# Patient Record
Sex: FEMALE | Race: WHITE | NOT HISPANIC OR LATINO | URBAN - NONMETROPOLITAN AREA
[De-identification: names, ages, dates, MRNs, and addresses within clinical notes are randomized per-mention and may not be internally consistent; named-entity substitution may affect disease eponyms.]

---

## 2022-11-09 ENCOUNTER — OFFICE VISIT (OUTPATIENT)
Dept: CARDIOLOGY | Facility: CLINIC | Age: 52
End: 2022-11-09

## 2022-11-09 VITALS
HEART RATE: 88 BPM | WEIGHT: 115.8 LBS | DIASTOLIC BLOOD PRESSURE: 76 MMHG | HEIGHT: 67 IN | SYSTOLIC BLOOD PRESSURE: 110 MMHG | OXYGEN SATURATION: 98 % | BODY MASS INDEX: 18.18 KG/M2

## 2022-11-09 DIAGNOSIS — R07.2 PRECORDIAL PAIN: ICD-10-CM

## 2022-11-09 DIAGNOSIS — R00.2 PALPITATIONS: ICD-10-CM

## 2022-11-09 DIAGNOSIS — F17.200 SMOKER: ICD-10-CM

## 2022-11-09 DIAGNOSIS — R06.02 SHORTNESS OF BREATH: Primary | ICD-10-CM

## 2022-11-09 PROBLEM — J45.909 ASTHMA: Status: ACTIVE | Noted: 2022-11-09

## 2022-11-09 PROBLEM — J44.9 COPD (CHRONIC OBSTRUCTIVE PULMONARY DISEASE): Status: ACTIVE | Noted: 2022-11-09

## 2022-11-09 PROCEDURE — 99204 OFFICE O/P NEW MOD 45 MIN: CPT | Performed by: NURSE PRACTITIONER

## 2022-11-09 PROCEDURE — 93000 ELECTROCARDIOGRAM COMPLETE: CPT | Performed by: NURSE PRACTITIONER

## 2022-11-09 RX ORDER — ALBUTEROL SULFATE 90 UG/1
AEROSOL, METERED RESPIRATORY (INHALATION)
COMMUNITY
Start: 2022-10-20

## 2022-11-09 RX ORDER — ESTRADIOL 2 MG/1
TABLET ORAL DAILY
COMMUNITY
Start: 2022-10-19

## 2022-11-09 RX ORDER — OXYBUTYNIN CHLORIDE 10 MG/1
TABLET, EXTENDED RELEASE ORAL DAILY
COMMUNITY
Start: 2022-10-19

## 2022-11-09 RX ORDER — NITROGLYCERIN 0.4 MG/1
TABLET SUBLINGUAL
Qty: 30 TABLET | Refills: 5 | Status: SHIPPED | OUTPATIENT
Start: 2022-11-09

## 2022-11-09 RX ORDER — ALBUTEROL SULFATE 2.5 MG/3ML
SOLUTION RESPIRATORY (INHALATION)
COMMUNITY
Start: 2022-10-19

## 2022-11-09 RX ORDER — ETHAMBUTOL HYDROCHLORIDE 400 MG/1
TABLET, FILM COATED ORAL DAILY
COMMUNITY
Start: 2022-10-19

## 2022-11-09 RX ORDER — TIOTROPIUM BROMIDE INHALATION SPRAY 3.12 UG/1
2 SPRAY, METERED RESPIRATORY (INHALATION) 2 TIMES DAILY
COMMUNITY

## 2022-11-09 RX ORDER — HYDROCODONE BITARTRATE AND ACETAMINOPHEN 10; 325 MG/1; MG/1
TABLET ORAL 2 TIMES DAILY PRN
COMMUNITY
Start: 2022-10-20

## 2022-11-09 RX ORDER — BUDESONIDE AND FORMOTEROL FUMARATE DIHYDRATE 160; 4.5 UG/1; UG/1
2 AEROSOL RESPIRATORY (INHALATION) 2 TIMES DAILY
COMMUNITY
Start: 2022-10-19

## 2022-11-09 RX ORDER — SUCRALFATE 1 G/1
1 TABLET ORAL 4 TIMES DAILY
COMMUNITY

## 2022-11-09 RX ORDER — CLARITHROMYCIN 500 MG/1
500 TABLET, COATED ORAL 2 TIMES DAILY
COMMUNITY

## 2022-11-09 NOTE — PROGRESS NOTES
Subjective     Jessica Kaur is a 52 y.o. female who presents to day for Establish Care (Here for eval. Per Dr. Mccarthy.), Chest Pain, Shortness of Breath, and Palpitations.    CHIEF COMPLIANT  Chief Complaint   Patient presents with   • Establish Care     Here for eval. Per Dr. Mccarthy.   • Chest Pain   • Shortness of Breath   • Palpitations     Problem List:    0. Chest pain  1. Shortness of breath  2. COPD / Asthma  3. Palpitations  4. Smoker    Problem List Items Addressed This Visit        Cardiac and Vasculature    Precordial pain    Relevant Medications    nitroglycerin (NITROSTAT) 0.4 MG SL tablet    Other Relevant Orders    ECG 12 Lead    Stress Test With Myocardial Perfusion One Day    Adult Transthoracic Echo Complete W/ Cont if Necessary Per Protocol    Palpitations    Relevant Medications    nitroglycerin (NITROSTAT) 0.4 MG SL tablet    Other Relevant Orders    ECG 12 Lead    Stress Test With Myocardial Perfusion One Day    Adult Transthoracic Echo Complete W/ Cont if Necessary Per Protocol       Pulmonary and Pneumonias    Shortness of breath - Primary    Relevant Medications    nitroglycerin (NITROSTAT) 0.4 MG SL tablet    Other Relevant Orders    ECG 12 Lead    Stress Test With Myocardial Perfusion One Day    Adult Transthoracic Echo Complete W/ Cont if Necessary Per Protocol       Tobacco    Smoker    Relevant Medications    nitroglycerin (NITROSTAT) 0.4 MG SL tablet    Other Relevant Orders    Stress Test With Myocardial Perfusion One Day    Adult Transthoracic Echo Complete W/ Cont if Necessary Per Protocol       HPI  Ms. Jessica Kaur is a 52-year-old female who is accompanied by her daughter today.    The patient is here today for cardiac evaluation to decrease diffusion capacity found by pulmonology, chest pain, dyspnea, and palpitations. She is currently not on any cardiac medication. The patient has a history of chronic obstructive pulmonary disease and asthma. She is a tobacco smoker, who smoked  2 to 3 packs a day in the past, but now smokes approximately 0.5 packs a day for approximately over 30 years. Her blood pressure is 110/76 mm Hg.    The patient states the onset of her chest pain was a week ago. She denied any pain before that time. The patient describes the pain as sharp and states she feels like someone is taking a hammer and beating her chest. She states it occurs in her sternum area and it causes dyspnea, abdominal discomfort and diaphoresis. The patient states her episodes last about 45 minutes to 1 hour. She states her pain is not exacerbated or alleviated by anything and it occurs during activity or at rest. The patient states she could be ambulating and the chest pain will begin. She denies taking any medication to alleviate her symptoms.    The patient states her dyspnea has been occurring for a long time. She states she experiences dyspnea when she wakes up in the morning and when moving around. The patient states she can occasionally lay flat without smothering, but other times she does. She states that she has woken up at night gasping for air.     The patient states she feels a sense of tachycardia when she is experiencing palpitations. She states her heart feels like it is skipping a beat and it occurs every other day.    The patient states she feels dizzy when standing up too quickly.    The patient has edema in her bilateral lower extremities daily because she works at [employer], so she is constantly on her feet.    The patient states with her fatigue, she feels tired all of the time. She states she does not get enough sleep due to waking up and feeling like she is smothering. The patient states she is being followed by Dr. Erin Vaca in pulmonology. She states she feels pain when inhaling in her chest area. The patient denies being able to walk on a treadmill to complete a stress test because of her COPD and dyspnea.    The patient states she is being followed by DELORES Hall  Osorio for the trouble with her back.                      PRIOR MEDS  Current Outpatient Medications on File Prior to Visit   Medication Sig Dispense Refill   • albuterol (PROVENTIL) (2.5 MG/3ML) 0.083% nebulizer solution prn     • clarithromycin (BIAXIN) 500 MG tablet Take 1 tablet by mouth 2 (Two) Times a Day.     • estradiol (ESTRACE) 2 MG tablet Daily.     • ethambutol (MYAMBUTOL) 400 MG tablet Daily.     • HYDROcodone-acetaminophen (NORCO)  MG per tablet 2 (Two) Times a Day As Needed.     • oxybutynin XL (DITROPAN-XL) 10 MG 24 hr tablet Daily.     • sucralfate (CARAFATE) 1 g tablet Take 1 tablet by mouth 4 (Four) Times a Day.     • Symbicort 160-4.5 MCG/ACT inhaler 2 puffs 2 (Two) Times a Day.     • tiotropium bromide monohydrate (Spiriva Respimat) 2.5 MCG/ACT aerosol solution inhaler Inhale 2 puffs 2 (Two) Times a Day.     • Ventolin  (90 Base) MCG/ACT inhaler prn       No current facility-administered medications on file prior to visit.       ALLERGIES  Oxycontin [oxycodone] and Percocet [oxycodone-acetaminophen]    HISTORY  Past Medical History:   Diagnosis Date   • Asthma    • COPD (chronic obstructive pulmonary disease) (HCC)    • Shortness of breath    • Smoker        Social History     Socioeconomic History   • Marital status:    Tobacco Use   • Smoking status: Every Day     Types: Cigarettes   • Smokeless tobacco: Never   • Tobacco comments:     Has smoked 2-3 PPD in past, but  now smokes approx. 1/2 PPD. Smoked approx. 30+ years   Substance and Sexual Activity   • Alcohol use: Never   • Drug use: Never       Family History   Problem Relation Age of Onset   • Asthma Mother    • No Known Problems Father        Review of Systems   Constitutional: Positive for fatigue.   HENT: Negative.    Eyes: Positive for visual disturbance (reading glasses).   Respiratory: Positive for shortness of breath.    Cardiovascular: Positive for chest pain, palpitations and leg swelling.  "  Gastrointestinal: Positive for diarrhea and vomiting.   Endocrine: Negative.    Genitourinary: Negative.    Musculoskeletal: Negative.    Skin: Negative.    Allergic/Immunologic: Negative.    Neurological: Positive for dizziness (occas. ). Negative for syncope.   Hematological: Bruises/bleeds easily.   Psychiatric/Behavioral: Positive for sleep disturbance.       Objective     VITALS: /76 (BP Location: Left arm, Patient Position: Sitting)   Pulse 88   Ht 170.2 cm (67\")   Wt 52.5 kg (115 lb 12.8 oz)   SpO2 98%   BMI 18.14 kg/m²     LABS:   Lab Results (most recent)     None          IMAGING:   No Images in the past 120 days found..    EXAM:  Physical Exam  Vitals and nursing note reviewed.   Constitutional:       Appearance: She is well-developed.   HENT:      Head: Normocephalic.   Eyes:      Pupils: Pupils are equal, round, and reactive to light.   Neck:      Thyroid: No thyroid mass.      Vascular: No carotid bruit or JVD.      Trachea: Trachea and phonation normal.   Cardiovascular:      Rate and Rhythm: Normal rate and regular rhythm.      Pulses:           Radial pulses are 2+ on the right side and 2+ on the left side.        Posterior tibial pulses are 2+ on the right side and 2+ on the left side.      Heart sounds: Normal heart sounds. No murmur heard.    No friction rub. No gallop.   Pulmonary:      Effort: Pulmonary effort is normal. No respiratory distress.      Breath sounds: Normal breath sounds. No wheezing or rales.   Abdominal:      General: Bowel sounds are normal.      Palpations: Abdomen is soft.   Musculoskeletal:         General: Swelling present. Normal range of motion.      Cervical back: Neck supple.   Skin:     General: Skin is warm and dry.      Capillary Refill: Capillary refill takes less than 2 seconds.      Findings: No rash.   Neurological:      Mental Status: She is alert and oriented to person, place, and time.   Psychiatric:         Speech: Speech normal.         " Behavior: Behavior normal.         Thought Content: Thought content normal.         Judgment: Judgment normal.         Procedure     ECG 12 Lead    Date/Time: 11/9/2022 9:57 AM  Performed by: Duglas Whittaker APRN  Authorized by: Duglas Whittaker APRN   Comparison: not compared with previous ECG   Previous ECG: no previous ECG available  Rhythm: sinus rhythm  Rate: normal  BPM: 82  QRS axis: right  Other findings: right atrial abnormality  Comments:  ms  No acute changes               Assessment & Plan    Diagnosis Plan   1. Shortness of breath  ECG 12 Lead    Stress Test With Myocardial Perfusion One Day    Adult Transthoracic Echo Complete W/ Cont if Necessary Per Protocol    nitroglycerin (NITROSTAT) 0.4 MG SL tablet      2. Smoker  Stress Test With Myocardial Perfusion One Day    Adult Transthoracic Echo Complete W/ Cont if Necessary Per Protocol    nitroglycerin (NITROSTAT) 0.4 MG SL tablet      3. Palpitations  ECG 12 Lead    Stress Test With Myocardial Perfusion One Day    Adult Transthoracic Echo Complete W/ Cont if Necessary Per Protocol    nitroglycerin (NITROSTAT) 0.4 MG SL tablet      4. Precordial pain  ECG 12 Lead    Stress Test With Myocardial Perfusion One Day    Adult Transthoracic Echo Complete W/ Cont if Necessary Per Protocol    nitroglycerin (NITROSTAT) 0.4 MG SL tablet      Plan:  1.  Due to patient's shortness of breath, chest pain, fatigue and decreased diffusion capacity per pulmonology I do think it is appropriate for patient to undergo an ischemic evaluation including stress test and echocardiogram.  2.  Patient does have palpitations in which do not occur on a very frequent basis.  Therefore if her palpitations do become worse we will consider Holter monitor at that time help determine the etiology of her palpitations.  3.  Patient was prescribed sublingual nitroglycerin and advised he can take up to 3 doses 5 minutes apart and if pain is not relieved after the third dose go to the  emergency department.  4.  Informed of signs and symptoms of ACS and advised to seek emergent treatment for any new worsening symptoms.  Patient also advised sooner follow-up as needed.  Also advised to follow-up with family doctor as needed  This note is dictated utilizing voice recognition software.  Although this record has been proof read, transcriptional errors may still be present. If questions occur regarding the content of this record please do not hesitate to call our office.  I have reviewed and confirmed the accuracy of the ROS as documented by the MA/LPN/RN DELORES Rocha  Return in about 3 months (around 2/9/2023), or if symptoms worsen or fail to improve.    Diagnoses and all orders for this visit:    1. Shortness of breath (Primary)  -     ECG 12 Lead  -     Stress Test With Myocardial Perfusion One Day; Future  -     Adult Transthoracic Echo Complete W/ Cont if Necessary Per Protocol; Future  -     nitroglycerin (NITROSTAT) 0.4 MG SL tablet; 1 under the tongue as needed for angina, may repeat q5mins for up three doses  Dispense: 30 tablet; Refill: 5    2. Smoker  -     Stress Test With Myocardial Perfusion One Day; Future  -     Adult Transthoracic Echo Complete W/ Cont if Necessary Per Protocol; Future  -     nitroglycerin (NITROSTAT) 0.4 MG SL tablet; 1 under the tongue as needed for angina, may repeat q5mins for up three doses  Dispense: 30 tablet; Refill: 5    3. Palpitations  -     ECG 12 Lead  -     Stress Test With Myocardial Perfusion One Day; Future  -     Adult Transthoracic Echo Complete W/ Cont if Necessary Per Protocol; Future  -     nitroglycerin (NITROSTAT) 0.4 MG SL tablet; 1 under the tongue as needed for angina, may repeat q5mins for up three doses  Dispense: 30 tablet; Refill: 5    4. Precordial pain  -     ECG 12 Lead  -     Stress Test With Myocardial Perfusion One Day; Future  -     Adult Transthoracic Echo Complete W/ Cont if Necessary Per Protocol; Future  -      nitroglycerin (NITROSTAT) 0.4 MG SL tablet; 1 under the tongue as needed for angina, may repeat q5mins for up three doses  Dispense: 30 tablet; Refill: 5        Assessment:  1. Chest pain    Jessica Kaur  reports that she has been smoking cigarettes. She has never used smokeless tobacco.. I have educated her on the risk of diseases from using tobacco products such as cancer, COPD and heart disease.     I advised her to quit and she is not willing to quit.    I spent 3  minutes counseling the patient.           BMI is below normal parameters (malnutrition). Recommendations: pcp addressing           MEDS ORDERED DURING VISIT:  New Medications Ordered This Visit   Medications   • nitroglycerin (NITROSTAT) 0.4 MG SL tablet     Si under the tongue as needed for angina, may repeat q5mins for up three doses     Dispense:  30 tablet     Refill:  5           This document has been electronically signed by DELORES Rocha Jr.  2022 10:06 EST      Transcribed from ambient dictation for DELORES Rocha by Amie Reaves.  22   17:48 EST    Patient or patient representative verbalized consent to the visit recording.  I have personally performed the services described in this document as transcribed by the above individual, and it is both accurate and complete.

## 2023-01-18 ENCOUNTER — HOSPITAL ENCOUNTER (OUTPATIENT)
Dept: CARDIOLOGY | Facility: HOSPITAL | Age: 53
Discharge: HOME | End: 2023-01-18
Payer: COMMERCIAL

## 2023-01-18 VITALS — HEIGHT: 67 IN | WEIGHT: 115.74 LBS | BODY MASS INDEX: 18.17 KG/M2

## 2023-01-18 DIAGNOSIS — R00.2 PALPITATIONS: ICD-10-CM

## 2023-01-18 DIAGNOSIS — R07.2 PRECORDIAL PAIN: ICD-10-CM

## 2023-01-18 DIAGNOSIS — F17.200 SMOKER: ICD-10-CM

## 2023-01-18 DIAGNOSIS — R06.02 SHORTNESS OF BREATH: ICD-10-CM

## 2023-01-18 LAB
BH CV REST NUCLEAR ISOTOPE DOSE: 10 MCI
BH CV STRESS COMMENTS STAGE 1: NORMAL
BH CV STRESS DOSE REGADENOSON STAGE 1: 0.4
BH CV STRESS DURATION MIN STAGE 1: 0
BH CV STRESS DURATION SEC STAGE 1: 10
BH CV STRESS NUCLEAR ISOTOPE DOSE: 30 MCI
BH CV STRESS PROTOCOL 1: NORMAL
BH CV STRESS RECOVERY BP: NORMAL MMHG
BH CV STRESS RECOVERY HR: 88 BPM
BH CV STRESS STAGE 1: 1
MAXIMAL PREDICTED HEART RATE: 167 BPM
PERCENT MAX PREDICTED HR: 46.11 %
STRESS BASELINE BP: NORMAL MMHG
STRESS BASELINE HR: 79 BPM
STRESS PERCENT HR: 54 %
STRESS POST PEAK BP: NORMAL MMHG
STRESS POST PEAK HR: 77 BPM
STRESS TARGET HR: 142 BPM

## 2023-01-18 PROCEDURE — 93017 CV STRESS TEST TRACING ONLY: CPT

## 2023-01-18 PROCEDURE — A9500 TC99M SESTAMIBI: HCPCS | Performed by: INTERNAL MEDICINE

## 2023-01-18 PROCEDURE — 0 TECHNETIUM SESTAMIBI: Performed by: INTERNAL MEDICINE

## 2023-01-18 PROCEDURE — 78452 HT MUSCLE IMAGE SPECT MULT: CPT | Performed by: INTERNAL MEDICINE

## 2023-01-18 PROCEDURE — 93306 TTE W/DOPPLER COMPLETE: CPT | Performed by: INTERNAL MEDICINE

## 2023-01-18 PROCEDURE — 93306 TTE W/DOPPLER COMPLETE: CPT

## 2023-01-18 PROCEDURE — 25010000002 REGADENOSON 0.4 MG/5ML SOLUTION: Performed by: INTERNAL MEDICINE

## 2023-01-18 PROCEDURE — 93018 CV STRESS TEST I&R ONLY: CPT | Performed by: INTERNAL MEDICINE

## 2023-01-18 PROCEDURE — 78452 HT MUSCLE IMAGE SPECT MULT: CPT

## 2023-01-18 RX ADMIN — REGADENOSON 0.4 MG: 0.08 INJECTION, SOLUTION INTRAVENOUS at 13:33

## 2023-01-18 RX ADMIN — TECHNETIUM TC 99M SESTAMIBI 1 DOSE: 1 INJECTION INTRAVENOUS at 10:56

## 2023-01-18 RX ADMIN — TECHNETIUM TC 99M SESTAMIBI 1 DOSE: 1 INJECTION INTRAVENOUS at 13:34

## 2023-01-23 ENCOUNTER — TELEPHONE (OUTPATIENT)
Dept: CARDIOLOGY | Facility: CLINIC | Age: 53
End: 2023-01-23
Payer: COMMERCIAL

## 2023-01-23 NOTE — TELEPHONE ENCOUNTER
STRESS  Pt notified of no acute findings. Provider will discuss results at f/u. Pt reminded of appt date and time.  ----- Message from Jimena Umana MA sent at 1/19/2023  1:39 PM EST -----    ----- Message -----  From: Duglas Whittaker APRN  Sent: 1/19/2023   1:01 PM EST  To: Jimena Umana MA    Patient was found to have a normal stress test with no evidence of ischemia.  Keep follow-up.

## 2023-02-04 LAB
AORTIC DIMENSIONLESS INDEX: 1.1 (DI)
BH CV ECHO MEAS - AO MAX PG: 2.8 MMHG
BH CV ECHO MEAS - AO MEAN PG: 1.36 MMHG
BH CV ECHO MEAS - AO ROOT DIAM: 2.6 CM
BH CV ECHO MEAS - AO V2 MAX: 83.1 CM/SEC
BH CV ECHO MEAS - AO V2 VTI: 19.3 CM
BH CV ECHO MEAS - EDV(CUBED): 46.3 ML
BH CV ECHO MEAS - EF(MOD-BP): 56 %
BH CV ECHO MEAS - ESV(CUBED): 17.1 ML
BH CV ECHO MEAS - FS: 28.3 %
BH CV ECHO MEAS - IVS/LVPW: 1.08 CM
BH CV ECHO MEAS - IVSD: 0.67 CM
BH CV ECHO MEAS - LA DIMENSION: 2.21 CM
BH CV ECHO MEAS - LAT PEAK E' VEL: 10.2 CM/SEC
BH CV ECHO MEAS - LV MASS(C)D: 59.2 GRAMS
BH CV ECHO MEAS - LV MAX PG: 3.5 MMHG
BH CV ECHO MEAS - LV MEAN PG: 1.34 MMHG
BH CV ECHO MEAS - LV V1 MAX: 93.6 CM/SEC
BH CV ECHO MEAS - LV V1 VTI: 18.9 CM
BH CV ECHO MEAS - LVIDD: 3.6 CM
BH CV ECHO MEAS - LVIDS: 2.6 CM
BH CV ECHO MEAS - LVPWD: 0.62 CM
BH CV ECHO MEAS - MED PEAK E' VEL: 8.1 CM/SEC
BH CV ECHO MEAS - MV A MAX VEL: 71 CM/SEC
BH CV ECHO MEAS - MV DEC SLOPE: 494.8 CM/SEC2
BH CV ECHO MEAS - MV DEC TIME: 0.26 MSEC
BH CV ECHO MEAS - MV E MAX VEL: 93 CM/SEC
BH CV ECHO MEAS - MV E/A: 1.31
BH CV ECHO MEAS - MV MAX PG: 5.6 MMHG
BH CV ECHO MEAS - MV MEAN PG: 2.27 MMHG
BH CV ECHO MEAS - MV P1/2T: 68 MSEC
BH CV ECHO MEAS - MV V2 VTI: 32.1 CM
BH CV ECHO MEAS - MVA(P1/2T): 3.2 CM2
BH CV ECHO MEAS - RAP SYSTOLE: 10 MMHG
BH CV ECHO MEAS - RVDD: 2.8 CM
BH CV ECHO MEAS - RVSP: 27.7 MMHG
BH CV ECHO MEAS - TR MAX PG: 17.7 MMHG
BH CV ECHO MEAS - TR MAX VEL: 210.6 CM/SEC
BH CV ECHO MEASUREMENTS AVERAGE E/E' RATIO: 10.16
MAXIMAL PREDICTED HEART RATE: 167 BPM
STRESS TARGET HR: 142 BPM

## 2023-02-08 ENCOUNTER — TELEPHONE (OUTPATIENT)
Dept: CARDIOLOGY | Facility: CLINIC | Age: 53
End: 2023-02-08
Payer: COMMERCIAL

## 2023-02-08 NOTE — TELEPHONE ENCOUNTER
ECHO  Pt notified of no acute findings. Provider will discuss results at f/u. Pt reminded of appt date and time.  ----- Message from Jimena Umana MA sent at 2/7/2023  5:23 PM EST -----    ----- Message -----  From: Duglas Whittaker APRN  Sent: 2/6/2023  10:41 AM EST  To: Jimena Umana MA    There is no acute findings on the echocardiogram.  Keep follow-up.

## 2023-02-23 ENCOUNTER — OFFICE VISIT (OUTPATIENT)
Dept: CARDIOLOGY | Facility: CLINIC | Age: 53
End: 2023-02-23
Payer: COMMERCIAL

## 2023-02-23 VITALS
HEART RATE: 78 BPM | SYSTOLIC BLOOD PRESSURE: 101 MMHG | WEIGHT: 124 LBS | OXYGEN SATURATION: 96 % | DIASTOLIC BLOOD PRESSURE: 66 MMHG | HEIGHT: 67 IN | BODY MASS INDEX: 19.46 KG/M2

## 2023-02-23 DIAGNOSIS — R07.2 PRECORDIAL PAIN: ICD-10-CM

## 2023-02-23 DIAGNOSIS — R00.2 PALPITATIONS: ICD-10-CM

## 2023-02-23 DIAGNOSIS — R06.02 SHORTNESS OF BREATH: Primary | ICD-10-CM

## 2023-02-23 PROCEDURE — 99214 OFFICE O/P EST MOD 30 MIN: CPT | Performed by: NURSE PRACTITIONER

## 2023-02-23 RX ORDER — AMLODIPINE BESYLATE 2.5 MG/1
2.5 TABLET ORAL DAILY
Qty: 30 TABLET | Refills: 11 | Status: SHIPPED | OUTPATIENT
Start: 2023-02-23

## 2023-03-03 ENCOUNTER — TELEPHONE (OUTPATIENT)
Dept: CARDIOLOGY | Facility: CLINIC | Age: 53
End: 2023-03-03
Payer: COMMERCIAL

## 2023-03-03 NOTE — TELEPHONE ENCOUNTER
Pt called and was transferred to me by the hub. I advised her that we need her to come in and sign a form so we can assist her. I did advise her that complete records take time, but we can get her started.

## 2023-03-03 NOTE — TELEPHONE ENCOUNTER
DELETE AFTER REVIEWING: Telephone encounter to be sent to the clinical pool.    Caller: Vincent Jessica L    Relationship: Self    Best call back number: [phone]    What form or medical record are you requesting: DUE TO DISABILITY CLAIM DEADLINE OF 3/14/23 PT REQUESTS ALL MEDICAL RECORDS.  PT IS WILLING TO  AT OFFICE.  PLEASE CALL PT TO INSTRUCT.      Who is requesting this form or medical record from you:   APPLERED  OF [facility]  PH:  [phone]    How would you like to receive the form or medical records (pick-up, mail, fax):       Timeframe paperwork needed: BEFORE 3/15/23

## 2023-03-09 RX ORDER — METOPROLOL TARTRATE 100 MG/1
TABLET ORAL
Qty: 180 TABLET | Refills: 3 | Status: SHIPPED | OUTPATIENT
Start: 2023-03-09

## 2023-03-21 ENCOUNTER — TELEPHONE (OUTPATIENT)
Dept: CARDIOLOGY | Facility: CLINIC | Age: 53
End: 2023-03-21
Payer: COMMERCIAL

## 2023-03-21 NOTE — TELEPHONE ENCOUNTER
This medication should have been sent in for qty 2 0 RF.       I called the px, informed them of our office's error and asked if pt paid out of pocket for rx, she stated that insurance covered the prescription and that she would cancel the refills they have on file.       I called the pt, I apologized for our office's error and explained to take 1 tab at 6:30 and bring the other tab with them, but only to take if told to by radiologist. Stated they can flush the rest of the meds. She verbalized understanding.

## 2023-03-21 NOTE — TELEPHONE ENCOUNTER
Caller: Jessica Kaur    Relationship: Self    Best call back number: [phone]    What is the best time to reach you: ANY    What was the call regarding: PATIENT PICKED UP HER PRESCRIPTION OF METOPROLOL & RECEIVED 180 PILLS. PATIENT WOULD LIKE TO KNOW IF SHE SHOULD BE TAKING THIS STARTING TODAY OR IF IT IS ONLY FOR THE DAY OF HER PROCEDURE. PLEASE ADVISE.     Do you require a callback: YES

## 2023-05-05 ENCOUNTER — TELEPHONE (OUTPATIENT)
Dept: CARDIOLOGY | Facility: CLINIC | Age: 53
End: 2023-05-05
Payer: COMMERCIAL

## 2023-05-05 NOTE — TELEPHONE ENCOUNTER
Pt called back and said she attempted to call us and the hospital several times yesterday with no avail. I have sent a message over to [facility] to reschedule the CCTA. I will call patient back with Apt and instructions.    Nica Pittman, Delia Rep

## 2023-05-05 NOTE — TELEPHONE ENCOUNTER
Caller: Jessica Kaur    Relationship to patient: Self    Best call back number: [phone]    Chief complaint: SCHEDULING    Type of visit: CT SCAN SCHEDULING    Requested date: ASAP    Additional notes:PT ATTEMPTING TO SCHEDULE THE CT SCAN WITH NO SUCCESS. REQUEST . HUB DOES NOT SCHEDULE. REQUEST A PHONE CALL.

## 2023-06-08 ENCOUNTER — TELEPHONE (OUTPATIENT)
Dept: CARDIOLOGY | Facility: CLINIC | Age: 53
End: 2023-06-08
Payer: COMMERCIAL

## 2023-06-08 NOTE — TELEPHONE ENCOUNTER
Patient aware and see Dr Martinez. CTA faxed.     ----- Message from DELORES Rocha sent at 6/8/2023  9:53 AM EDT -----  No evidence of hemodynamically significant coronary artery disease.  However there were multiple pulmonary nodules which would need to be followed by pulmonology.  If she does not have a current pulmonologist P please refer.  If she does not have a p  reference please refer to [facility]RELLA

## 2023-06-12 ENCOUNTER — OFFICE VISIT (OUTPATIENT)
Dept: CARDIOLOGY | Facility: CLINIC | Age: 53
End: 2023-06-12
Payer: COMMERCIAL

## 2023-06-12 VITALS
OXYGEN SATURATION: 97 % | WEIGHT: 125.8 LBS | BODY MASS INDEX: 19.74 KG/M2 | SYSTOLIC BLOOD PRESSURE: 99 MMHG | HEIGHT: 67 IN | DIASTOLIC BLOOD PRESSURE: 66 MMHG | HEART RATE: 91 BPM

## 2023-06-12 DIAGNOSIS — I95.0 IDIOPATHIC HYPOTENSION: ICD-10-CM

## 2023-06-12 DIAGNOSIS — R91.8 PULMONARY NODULES: ICD-10-CM

## 2023-06-12 DIAGNOSIS — R06.02 SHORTNESS OF BREATH: Primary | ICD-10-CM

## 2023-06-12 DIAGNOSIS — F17.200 SMOKER: ICD-10-CM

## 2023-06-12 DIAGNOSIS — R00.2 PALPITATIONS: ICD-10-CM

## 2023-06-12 DIAGNOSIS — R07.2 PRECORDIAL PAIN: ICD-10-CM

## 2023-06-12 PROCEDURE — 1159F MED LIST DOCD IN RCRD: CPT | Performed by: NURSE PRACTITIONER

## 2023-06-12 PROCEDURE — 99214 OFFICE O/P EST MOD 30 MIN: CPT | Performed by: NURSE PRACTITIONER

## 2023-06-12 PROCEDURE — 1160F RVW MEDS BY RX/DR IN RCRD: CPT | Performed by: NURSE PRACTITIONER

## 2023-06-12 RX ORDER — BUPROPION HYDROCHLORIDE 150 MG/1
150 TABLET ORAL DAILY
Qty: 30 TABLET | Refills: 11 | COMMUNITY
Start: 2023-05-04 | End: 2024-05-03

## 2023-06-12 RX ORDER — LORATADINE 10 MG/1
TABLET ORAL DAILY
COMMUNITY
Start: 2023-05-04

## 2023-06-12 RX ORDER — GUAIFENESIN 600 MG/1
TABLET, EXTENDED RELEASE ORAL
COMMUNITY
Start: 2023-06-01

## 2023-06-12 RX ORDER — MONTELUKAST SODIUM 10 MG/1
TABLET ORAL NIGHTLY
COMMUNITY
Start: 2023-05-04

## 2023-06-12 RX ORDER — OMEPRAZOLE 20 MG/1
20 CAPSULE, DELAYED RELEASE ORAL DAILY
COMMUNITY
Start: 2023-05-04

## 2023-06-12 NOTE — PROGRESS NOTES
Subjective     Jessica Kaur is a 52 y.o. female who presents to day for Shortness of Breath (3 month CTA f/u), Palpitations, and Chest Pain.    CHIEF COMPLIANT  Chief Complaint   Patient presents with   • Shortness of Breath     3 month CTA f/u   • Palpitations   • Chest Pain       Active Problems:  Problem List Items Addressed This Visit    None  Problem List:  1. Shortness of breath  1.2 echocardiogram 1/23: EF 50 to 55% normal diastolic function trivial MR and TR  2. Chest pain  2.1 stress test 1/23: Negative for ischemia post-stress ejection fraction 79%  3. Palpitations  4. Smoker    HPI  Shortness of Breath  Associated symptoms include chest pain and headaches. Pertinent negatives include no fever, leg swelling, neck pain, vomiting or wheezing.   Palpitations   Associated symptoms include chest pain, coughing (related to weather) and shortness of breath. Pertinent negatives include no diaphoresis, dizziness, fever, nausea, numbness, vomiting or weakness.   Chest Pain   Associated symptoms include back pain, a cough (related to weather), headaches, palpitations and shortness of breath. Pertinent negatives include no diaphoresis, dizziness, fever, nausea, numbness, vomiting or weakness.      Jessica Kaur is a 52-year-old female patient being followed up today for CTA of the heart results, palpitations and chest pain. Patient did go under CTA of the heart that identified advanced emphysema with scattered nodules throughout with the largest being 0.8 cm. No evidence of any hemodynamically significant coronary artery disease. The patient underwent a stress test, which did not show any signs of blockage. A follow-up chest CT is recommended as well as referral to pulmonology. Differential includes infectious granulomatous disease, metastatic and primary neoplasm.     The patient's most recent echocardiogram revealed an ejection fraction of 50 to 55 percent. No systolic heart failure. Resting pressures were normal.  Left atrium is in the upper limits of normal. Right side of the heart is normal. No significant pulmonary hypertension.    The patient's blood pressure is 99/66 mmHg today. She states Isabel ESTRELLA took her off her amlodipine 2.5 mg because she did not know who prescribed it to her. She states she never received the prescription.  The amlodipine was originally prescribed for antianginal therapy.    The patient inquires if she should take her nitroglycerin. The patient states her angina is the same.  She describes her chest pain in her upper left chest that is occurring approximately every other day at random and lasts approximately 5 minutes.  The pain is described as a sharp-like sensation.  Nitroglycerin does help relieve the pain.    She does continue to have shortness of breath that mainly occurs with exertion.  She reports that this has been occurring for a long time and start when she first wakes up in the morning gets moving around.  She does have some level of orthopnea as well as PND where she has been woken up gasping for air.    She also reports chronic fatigue where she is tired all the time.  She feels that this may be contributing some to not sleeping well.    PRIOR MEDS  Current Outpatient Medications on File Prior to Visit   Medication Sig Dispense Refill   • albuterol (PROVENTIL) (2.5 MG/3ML) 0.083% nebulizer solution prn     • buPROPion XL (WELLBUTRIN XL) 150 MG 24 hr tablet Take 1 tablet by mouth Daily. 30 tablet 11   • clarithromycin (BIAXIN) 500 MG tablet Take 1 tablet by mouth 2 (Two) Times a Day.     • estradiol (ESTRACE) 2 MG tablet Daily.     • ethambutol (MYAMBUTOL) 400 MG tablet Daily.     • HYDROcodone-acetaminophen (NORCO)  MG per tablet 2 (Two) Times a Day As Needed.     • loratadine (CLARITIN) 10 MG tablet Take  by mouth Daily.     • montelukast (SINGULAIR) 10 MG tablet Every Night.     • Mucus Relief 600 MG 12 hr tablet      • nitroglycerin (NITROSTAT) 0.4 MG SL tablet 1  under the tongue as needed for angina, may repeat q5mins for up three doses 30 tablet 5   • omeprazole (priLOSEC) 20 MG capsule Take 1 capsule by mouth Daily.     • oxybutynin XL (DITROPAN-XL) 10 MG 24 hr tablet Daily.     • sucralfate (CARAFATE) 1 g tablet Take 1 tablet by mouth 4 (Four) Times a Day.     • Symbicort 160-4.5 MCG/ACT inhaler 2 puffs 2 (Two) Times a Day.     • tiotropium bromide monohydrate (Spiriva Respimat) 2.5 MCG/ACT aerosol solution inhaler Inhale 2 puffs 2 (Two) Times a Day.     • Ventolin  (90 Base) MCG/ACT inhaler prn     • [DISCONTINUED] amLODIPine (NORVASC) 2.5 MG tablet Take 1 tablet by mouth Daily. (Patient not taking: Reported on 6/12/2023) 30 tablet 11   • [DISCONTINUED] metoprolol tartrate (LOPRESSOR) 100 MG tablet Take one tab @ 6:30 am day of test take second tab with you to testing 180 tablet 3     No current facility-administered medications on file prior to visit.       ALLERGIES  Oxycontin [oxycodone] and Percocet [oxycodone-acetaminophen]    HISTORY  Past Medical History:   Diagnosis Date   • Asthma    • COPD (chronic obstructive pulmonary disease)    • Shortness of breath    • Smoker        Social History     Socioeconomic History   • Marital status:    Tobacco Use   • Smoking status: Every Day     Types: Cigarettes   • Smokeless tobacco: Never   • Tobacco comments:     Has smoked 2-3 PPD in past, but  now smokes approx. 1/2 PPD. Smoked approx. 30+ years   Substance and Sexual Activity   • Alcohol use: Never   • Drug use: Never       Family History   Problem Relation Age of Onset   • Asthma Mother    • No Known Problems Father        Review of Systems   Constitutional: Negative.  Negative for chills, diaphoresis, fatigue and fever.   HENT: Negative.     Eyes: Negative.  Negative for visual disturbance.   Respiratory:  Positive for cough (related to weather), chest tightness (on and off) and shortness of breath. Negative for apnea and wheezing.    Cardiovascular:   "Positive for chest pain and palpitations. Negative for leg swelling.   Gastrointestinal: Negative.  Negative for blood in stool, constipation, diarrhea, nausea and vomiting.   Endocrine: Negative.  Positive for cold intolerance.   Genitourinary:  Negative for menstrual problem.   Musculoskeletal: Negative.  Positive for back pain. Negative for arthralgias, myalgias (cramps in lower legs), neck pain and neck stiffness.   Allergic/Immunologic: Negative.  Negative for environmental allergies and food allergies.   Neurological:  Positive for headaches. Negative for dizziness, weakness, light-headedness and numbness.   Hematological:  Bruises/bleeds easily.   Psychiatric/Behavioral:  Positive for sleep disturbance (smothering at night on and off. Sleeps in fan and elevated).      Objective     VITALS: BP 99/66 (BP Location: Left arm, Patient Position: Sitting)   Pulse 91   Ht 170 cm (66.93\")   Wt 57.1 kg (125 lb 12.8 oz)   SpO2 97%   BMI 19.74 kg/m²     LABS:   Lab Results (most recent)       None            IMAGING:   CT Pelvis W IV Contrast    Result Date: 3/6/2023   1. There is a skin defect overlying the right gluteal musculature with an underlying tract of inflammation but no drainable fluid collection.  2. Inflammation throughout the ascending, transverse and descending colon, question colitis. No perforation or abscess. Follow-up with gastroenterology for possible colonoscopic workup.              EXAM:  Physical Exam  Vitals and nursing note reviewed.   Constitutional:       Appearance: She is well-developed.   HENT:      Head: Normocephalic.   Neck:      Thyroid: No thyroid mass.      Vascular: No carotid bruit or JVD.      Trachea: Trachea and phonation normal.   Cardiovascular:      Rate and Rhythm: Normal rate and regular rhythm.      Pulses:           Radial pulses are 2+ on the right side and 2+ on the left side.        Posterior tibial pulses are 2+ on the right side and 2+ on the left side.      " Heart sounds: Normal heart sounds. No murmur heard.    No friction rub. No gallop.   Pulmonary:      Effort: Pulmonary effort is normal. No respiratory distress.      Breath sounds: Normal breath sounds. No wheezing or rales.   Musculoskeletal:         General: No swelling. Normal range of motion.      Cervical back: Neck supple.   Skin:     General: Skin is warm and dry.      Capillary Refill: Capillary refill takes less than 2 seconds.      Findings: No rash.   Neurological:      Mental Status: She is alert and oriented to person, place, and time.   Psychiatric:         Speech: Speech normal.         Behavior: Behavior normal.         Thought Content: Thought content normal.         Judgment: Judgment normal.         Procedure   Procedures       Assessment & Plan    Diagnosis Plan   1. Shortness of breath  Ambulatory Referral to Pulmonology      2. Palpitations  Ambulatory Referral to Pulmonology      3. Precordial pain  Ambulatory Referral to Pulmonology      4. Smoker  Ambulatory Referral to Pulmonology      5. Pulmonary nodules  Ambulatory Referral to Pulmonology      6. Idiopathic hypotension  Blood Pressure Device          PLAN  1. The patient's recent cardiac testing results were discussed in full detail during this visit.  The CTA did not show any evidence of any hemodynamically significant coronary artery disease.  2. The patient's medication regimen was discussed in full detail with her.  3. The patient's blood pressure is 99/66 mmHg today. She was advised to monitor her blood pressure at home.  We will avoid starting amlodipine at this time due to hypotension.  4. A referral to pulmonology has been placed due to the presence of lung nodules on her recent CT scan as well as her advanced shortness of breath and history of COPD.   5. She will have labs obtained as discussed.  6.  Informed of signs and symptoms of ACS and advised to seek emergent treatment for any new worsening symptoms.  Patient also  advised sooner follow-up as needed.  Also advised to follow-up with family doctor as needed  This note is dictated utilizing voice recognition software.  Although this record has been proof read, transcriptional errors may still be present. If questions occur regarding the content of this record please do not hesitate to call our office.  I have reviewed and confirmed the accuracy of the ROS as documented by the MA/LPN/RN DELORES Rocha    Assessment  1. Palpitations  2. Angina  3. Lung nodules    No follow-ups on file.    Diagnoses and all orders for this visit:    1. Shortness of breath (Primary)  -     Cancel: Ambulatory Referral to Pulmonology  -     Ambulatory Referral to Pulmonology    2. Palpitations  -     Cancel: Ambulatory Referral to Pulmonology  -     Ambulatory Referral to Pulmonology    3. Precordial pain  -     Cancel: Ambulatory Referral to Pulmonology  -     Ambulatory Referral to Pulmonology    4. Smoker  -     Cancel: Ambulatory Referral to Pulmonology  -     Ambulatory Referral to Pulmonology    5. Pulmonary nodules  -     Cancel: Ambulatory Referral to Pulmonology  -     Ambulatory Referral to Pulmonology    6. Idiopathic hypotension  -     Blood Pressure Device        Jessica Kaur  reports that she has been smoking cigarettes. She has never used smokeless tobacco.. I have educated her on the risk of diseases from using tobacco products such as cancer, COPD, and heart disease.     I advised her to quit and she is not willing to quit.    I spent 3  minutes counseling the patient.           MEDS ORDERED DURING VISIT:  No orders of the defined types were placed in this encounter.          This document has been electronically signed by DELORES Rocha Jr.  June 12, 2023 15:00 EDT      Transcribed from ambient dictation for DELORES Rocha by Kim Fields, Quality .  06/12/23   17:04 EDT    Patient or patient representative verbalized consent to the visit  recording.  I have personally performed the services described in this document as transcribed by the above individual, and it is both accurate and complete.

## 2023-12-13 ENCOUNTER — OFFICE VISIT (OUTPATIENT)
Dept: CARDIOLOGY | Facility: CLINIC | Age: 53
End: 2023-12-13
Payer: COMMERCIAL

## 2023-12-13 VITALS
HEIGHT: 67 IN | HEART RATE: 89 BPM | BODY MASS INDEX: 18.11 KG/M2 | WEIGHT: 115.4 LBS | DIASTOLIC BLOOD PRESSURE: 64 MMHG | OXYGEN SATURATION: 97 % | SYSTOLIC BLOOD PRESSURE: 92 MMHG

## 2023-12-13 DIAGNOSIS — R07.2 PRECORDIAL PAIN: Primary | ICD-10-CM

## 2023-12-13 DIAGNOSIS — R06.02 SHORTNESS OF BREATH: ICD-10-CM

## 2023-12-13 DIAGNOSIS — I95.0 IDIOPATHIC HYPOTENSION: ICD-10-CM

## 2023-12-13 PROCEDURE — 99213 OFFICE O/P EST LOW 20 MIN: CPT | Performed by: NURSE PRACTITIONER

## 2023-12-13 PROCEDURE — 1160F RVW MEDS BY RX/DR IN RCRD: CPT | Performed by: NURSE PRACTITIONER

## 2023-12-13 PROCEDURE — 93000 ELECTROCARDIOGRAM COMPLETE: CPT | Performed by: NURSE PRACTITIONER

## 2023-12-13 PROCEDURE — 1159F MED LIST DOCD IN RCRD: CPT | Performed by: NURSE PRACTITIONER

## 2023-12-13 RX ORDER — RANOLAZINE 500 MG/1
500 TABLET, EXTENDED RELEASE ORAL 2 TIMES DAILY
Qty: 60 TABLET | Refills: 11 | Status: SHIPPED | OUTPATIENT
Start: 2023-12-13

## 2023-12-13 NOTE — PROGRESS NOTES
Subjective     Jessica Kaur is a 53 y.o. female who presents to day for 6 month follow up , Chest Pain (Sharp and achy also pain under shoulder blades arms are tingling legs do this also at times), and Shortness of Breath.    CHIEF COMPLIANT  Chief Complaint   Patient presents with    6 month follow up     Chest Pain     Sharp and achy also pain under shoulder blades arms are tingling legs do this also at times    Shortness of Breath       Active Problems:  Problem List Items Addressed This Visit          Cardiac and Vasculature    Precordial pain - Primary       Pulmonary and Pneumonias    Shortness of breath     Other Visit Diagnoses       Idiopathic hypotension            Problem List:  Shortness of breath  1.2 echocardiogram 1/23: EF 50 to 55% normal diastolic function trivial MR and TR  Chest pain  2.1 stress test 1/23: Negative for ischemia post-stress ejection fraction 79%  Palpitations  Smoker    HPI  HPI  Ms. Jessica Kaur is a 53-year-old female patient who is being seen today for chest pain.  Patient does report chest pain that occurs in the left chest goes into his shoulder blade left arm and behind her left shoulder blade.  This is intermittent and occurs 1-2 times a week.  Sort of a sharp-like sensation that occurs both with rest and exertion.  She does have associated shortness of breath and diaphoresis.    Patient does report shortness of breath occurs with activity such as walking.  She also becomes dyspneic at rest and orthopnea.  She does have a history of emphysema.  She is seeing pulmonology and thankful.  Dr. Elizabeth    Patient is hypotensive at 92/64 heart rate of 89.  She says this is about normally where her blood pressure runs.  Overall she feels reasonably well at this level.  PRIOR MEDS  Current Outpatient Medications on File Prior to Visit   Medication Sig Dispense Refill    albuterol (PROVENTIL) (2.5 MG/3ML) 0.083% nebulizer solution prn      buPROPion XL (WELLBUTRIN XL) 150 MG 24 hr  tablet Take 1 tablet by mouth Daily. 30 tablet 11    estradiol (ESTRACE) 2 MG tablet Daily.      ethambutol (MYAMBUTOL) 400 MG tablet Daily.      HYDROcodone-acetaminophen (NORCO)  MG per tablet 2 (Two) Times a Day As Needed.      loratadine (CLARITIN) 10 MG tablet Take  by mouth Daily.      montelukast (SINGULAIR) 10 MG tablet Every Night.      nitroglycerin (NITROSTAT) 0.4 MG SL tablet 1 under the tongue as needed for angina, may repeat q5mins for up three doses 30 tablet 5    omeprazole (priLOSEC) 20 MG capsule Take 1 capsule by mouth Daily.      oxybutynin XL (DITROPAN-XL) 10 MG 24 hr tablet Daily.      sucralfate (CARAFATE) 1 g tablet Take 1 tablet by mouth 4 (Four) Times a Day.      Symbicort 160-4.5 MCG/ACT inhaler 2 puffs 2 (Two) Times a Day.      tiotropium bromide monohydrate (Spiriva Respimat) 2.5 MCG/ACT aerosol solution inhaler Inhale 2 puffs 2 (Two) Times a Day.      Ventolin  (90 Base) MCG/ACT inhaler prn       No current facility-administered medications on file prior to visit.       ALLERGIES  Oxycontin [oxycodone] and Percocet [oxycodone-acetaminophen]    HISTORY  Past Medical History:   Diagnosis Date    Asthma     COPD (chronic obstructive pulmonary disease)     Shortness of breath     Smoker        Social History     Socioeconomic History    Marital status:    Tobacco Use    Smoking status: Every Day     Types: Cigarettes    Smokeless tobacco: Never    Tobacco comments:     Has smoked 2-3 PPD in past, but  now smokes approx. 1/2 PPD. Smoked approx. 30+ years   Substance and Sexual Activity    Alcohol use: Never    Drug use: Never       Family History   Problem Relation Age of Onset    Asthma Mother     No Known Problems Father        Review of Systems   Constitutional:  Positive for chills. Negative for fatigue and fever.   HENT:  Negative for congestion, rhinorrhea and sore throat.    Eyes:  Negative for visual disturbance.   Respiratory:  Positive for chest tightness (every  "now and then) and shortness of breath. Negative for apnea.    Cardiovascular:  Positive for chest pain (sharp and achy pain below shoulder blades arms and legs tingle). Negative for palpitations and leg swelling.   Gastrointestinal:  Positive for constipation. Negative for diarrhea and nausea.   Musculoskeletal:  Positive for back pain and neck pain. Negative for arthralgias.   Allergic/Immunologic: Positive for environmental allergies. Negative for food allergies.   Neurological:  Positive for dizziness (getting up or bending over), weakness and light-headedness. Negative for syncope.   Hematological:  Bruises/bleeds easily.   Psychiatric/Behavioral:  Positive for sleep disturbance (SOB at night hard to take a deep breath).        Objective     VITALS: BP 92/64 (BP Location: Left arm, Patient Position: Sitting, Cuff Size: Adult)   Pulse 89   Ht 170 cm (66.93\")   Wt 52.3 kg (115 lb 6.4 oz)   SpO2 97%   BMI 18.11 kg/m²     LABS:   Lab Results (most recent)       None            IMAGING:   No Images in the past 120 days found..    EXAM:  Physical Exam  Vitals and nursing note reviewed.   Constitutional:       Appearance: She is well-developed.   HENT:      Head: Normocephalic.   Neck:      Thyroid: No thyroid mass.      Vascular: No carotid bruit or JVD.      Trachea: Trachea and phonation normal.   Cardiovascular:      Rate and Rhythm: Normal rate and regular rhythm.      Pulses:           Radial pulses are 2+ on the right side and 2+ on the left side.        Posterior tibial pulses are 2+ on the right side and 2+ on the left side.      Heart sounds: Normal heart sounds. No murmur heard.     No friction rub. No gallop.   Pulmonary:      Effort: Pulmonary effort is normal. No respiratory distress.      Breath sounds: Normal breath sounds. No wheezing or rales.   Musculoskeletal:         General: No swelling. Normal range of motion.      Cervical back: Neck supple.   Skin:     General: Skin is warm and dry.      " Capillary Refill: Capillary refill takes less than 2 seconds.      Findings: No rash.   Neurological:      Mental Status: She is alert and oriented to person, place, and time.   Psychiatric:         Speech: Speech normal.         Behavior: Behavior normal.         Thought Content: Thought content normal.         Judgment: Judgment normal.         Procedure     ECG 12 Lead    Date/Time: 12/13/2023 3:46 PM  Performed by: Duglas Whittaker APRN    Authorized by: Duglas Whittaker APRN  Comparison: compared with previous ECG from 11/9/2022  Similar to previous ECG  Rhythm: sinus rhythm  Rate: normal  BPM: 91  QRS axis: right  Comments: QTc 417 ms  No acute changes             Assessment & Plan    Diagnosis Plan   1. Precordial pain        2. Shortness of breath        3. Idiopathic hypotension        1.  Patient does have chest pain and shortness of breath that is persistent she is previous had a stress test in January that was negative for ischemia with a preserved post stress ejection fraction of 79%.  Due to hypotension antianginal therapy is limited.  We will try her on Ranexa 500 mg daily.  She continues to have chest pain we will consider left heart catheterization or CTA for further evaluation.  2.  Patient does have idiopathic hypotension which her blood pressure is low today at 92/64 heart rate of 89.  We will continue to monitor at this time.  3.  Informed of signs and symptoms of ACS and advised to seek emergent treatment for any new worsening symptoms.  Patient also advised sooner follow-up as needed.  Also advised to follow-up with family doctor as needed  This note is dictated utilizing voice recognition software.  Although this record has been proof read, transcriptional errors may still be present. If questions occur regarding the content of this record please do not hesitate to call our office.  I have reviewed and confirmed the accuracy of the ROS as documented by the MA/LPN/RN DELORES Rocha    No  follow-ups on file.    Diagnoses and all orders for this visit:    1. Precordial pain (Primary)    2. Shortness of breath    3. Idiopathic hypotension    Other orders  -     ECG 12 Lead  -     ranolazine (Ranexa) 500 MG 12 hr tablet; Take 1 tablet by mouth 2 (Two) Times a Day.  Dispense: 60 tablet; Refill: 11        Jessica Kaur  reports that she has been smoking cigarettes. She has never used smokeless tobacco.. I have educated her on the risk of diseases from using tobacco products .           MEDS ORDERED DURING VISIT:  New Medications Ordered This Visit   Medications    ranolazine (Ranexa) 500 MG 12 hr tablet     Sig: Take 1 tablet by mouth 2 (Two) Times a Day.     Dispense:  60 tablet     Refill:  11           This document has been electronically signed by Duglas Whittaker Jr., APRN  December 14, 2023 10:01 EST

## 2024-05-13 ENCOUNTER — TELEPHONE (OUTPATIENT)
Dept: CARDIOLOGY | Facility: CLINIC | Age: 54
End: 2024-05-13
Payer: COMMERCIAL

## 2024-05-13 NOTE — TELEPHONE ENCOUNTER
Caller: Jessica Kaur    Relationship: Self    Best call back number: [phone]    What is the best time to reach you: ANY    Who are you requesting to speak with (clinical staff, provider,  specific staff member): CLINICAL      What was the call regarding: PT IS WONDERING IF JR WOULD WANT HER TO GET ANOTHER EKG DONE, SHE HASN'T SEEN HIM IN A FEW MONTHS SINCE HER TESTING FOLLOW UP WITH HIM FOR HER STRESS TEST AND ECHO

## 2024-05-13 NOTE — TELEPHONE ENCOUNTER
Follow up scheduled on 6/17, advised EKG can be done at that time if needed. EKGs normally done every 12 months. She is asymptomatic.

## 2024-06-17 ENCOUNTER — OFFICE VISIT (OUTPATIENT)
Dept: CARDIOLOGY | Facility: CLINIC | Age: 54
End: 2024-06-17
Payer: COMMERCIAL

## 2024-06-17 VITALS
OXYGEN SATURATION: 98 % | BODY MASS INDEX: 18.3 KG/M2 | HEART RATE: 81 BPM | DIASTOLIC BLOOD PRESSURE: 67 MMHG | SYSTOLIC BLOOD PRESSURE: 106 MMHG | WEIGHT: 116.6 LBS | HEIGHT: 67 IN

## 2024-06-17 DIAGNOSIS — R06.02 SHORTNESS OF BREATH: ICD-10-CM

## 2024-06-17 DIAGNOSIS — R07.2 PRECORDIAL PAIN: Primary | ICD-10-CM

## 2024-06-17 PROCEDURE — 1159F MED LIST DOCD IN RCRD: CPT | Performed by: NURSE PRACTITIONER

## 2024-06-17 PROCEDURE — 93000 ELECTROCARDIOGRAM COMPLETE: CPT | Performed by: NURSE PRACTITIONER

## 2024-06-17 PROCEDURE — 1160F RVW MEDS BY RX/DR IN RCRD: CPT | Performed by: NURSE PRACTITIONER

## 2024-06-17 PROCEDURE — 99214 OFFICE O/P EST MOD 30 MIN: CPT | Performed by: NURSE PRACTITIONER

## 2024-06-17 RX ORDER — ROSUVASTATIN CALCIUM 20 MG/1
20 TABLET, COATED ORAL DAILY
Qty: 90 TABLET | Refills: 3 | Status: SHIPPED | OUTPATIENT
Start: 2024-06-17

## 2024-06-17 RX ORDER — BUDESONIDE, GLYCOPYRROLATE, AND FORMOTEROL FUMARATE 160; 9; 4.8 UG/1; UG/1; UG/1
2 AEROSOL, METERED RESPIRATORY (INHALATION) 2 TIMES DAILY
COMMUNITY

## 2024-06-17 RX ORDER — RANOLAZINE 1000 MG/1
1000 TABLET, EXTENDED RELEASE ORAL 2 TIMES DAILY
Qty: 180 TABLET | Refills: 3 | Status: SHIPPED | OUTPATIENT
Start: 2024-06-17

## 2024-06-17 NOTE — PROGRESS NOTES
Subjective     Jessica Kaur is a 53 y.o. female who presents to day for 6 month follow up , Palpitations, and precordial pain.    CHIEF COMPLIANT  Chief Complaint   Patient presents with    6 month follow up     Palpitations    precordial pain       Active Problems:  Problem List Items Addressed This Visit          Cardiac and Vasculature    Precordial pain - Primary    Relevant Medications    ranolazine (Ranexa) 1000 MG 12 hr tablet       Pulmonary and Pneumonias    Shortness of breath    Relevant Medications    ranolazine (Ranexa) 1000 MG 12 hr tablet   Problem List:  1.  Shortness of breath  1.2 echocardiogram 1/23: EF 50 to 55% normal diastolic function trivial MR and TR  2.  Chest pain  2.1 stress test 1/23: Negative for ischemia post-stress ejection fraction 79%  3.  Palpitations  4.  Smoker    HPI  HPI  Ms. Jessica Kaur is a 53-year-old female patient being seen today for for continuation of chest pain and shortness of breath.    Patient does report chest pain that occurs intermittently in her chest.  She says it can last up to 45 minutes.  She says that this usually happens if she is walking or she gets too hot.  She says that the other day she got hot and she does have a stent there and allow the chest pain past.  She does have associated shortness of breath with her chest pain.  She denies any relieving factors.  She does say the Ranexa has helped a little bit.    Patient does report shortness of breath occurs with activity.  Walking short distances causes her to become dyspneic.  She denies any dyspnea at rest.  She does have some level of orthopnea where she is unable to lie flat and has to prop herself up.  She says at times it feels like she has a hard time taking a deep breath.    Patient does see pulmonology Dr. Kenneth Elizabeth in [place].  She is scheduled for repeat CT scan and June 19, 2024.  She follows up pulm June 25 for evaluation of her pulmonary nodules.  PRIOR MEDS  Current Outpatient  Medications on File Prior to Visit   Medication Sig Dispense Refill    albuterol (PROVENTIL) (2.5 MG/3ML) 0.083% nebulizer solution prn      Budeson-Glycopyrrol-Formoterol (Breztri Aerosphere) 160-9-4.8 MCG/ACT aerosol inhaler Inhale 2 puffs 2 (Two) Times a Day.      estradiol (ESTRACE) 2 MG tablet Daily.      ethambutol (MYAMBUTOL) 400 MG tablet Daily.      HYDROcodone-acetaminophen (NORCO)  MG per tablet 2 (Two) Times a Day As Needed.      loratadine (CLARITIN) 10 MG tablet Take  by mouth Daily.      montelukast (SINGULAIR) 10 MG tablet Every Night.      nitroglycerin (NITROSTAT) 0.4 MG SL tablet 1 under the tongue as needed for angina, may repeat q5mins for up three doses 30 tablet 5    omeprazole (priLOSEC) 20 MG capsule Take 1 capsule by mouth Daily.      oxybutynin XL (DITROPAN-XL) 10 MG 24 hr tablet Daily.      Ventolin  (90 Base) MCG/ACT inhaler prn      buPROPion XL (WELLBUTRIN XL) 150 MG 24 hr tablet Take 1 tablet by mouth Daily. 30 tablet 11     No current facility-administered medications on file prior to visit.       ALLERGIES  Oxycontin [oxycodone] and Percocet [oxycodone-acetaminophen]    HISTORY  Past Medical History:   Diagnosis Date    Asthma     COPD (chronic obstructive pulmonary disease)     Shortness of breath     Smoker        Social History     Socioeconomic History    Marital status:    Tobacco Use    Smoking status: Every Day     Types: Cigarettes    Smokeless tobacco: Never    Tobacco comments:     Has smoked 2-3 PPD in past, but  now smokes approx. 1/2 PPD. Smoked approx. 30+ years   Substance and Sexual Activity    Alcohol use: Never    Drug use: Never       Family History   Problem Relation Age of Onset    Asthma Mother     No Known Problems Father        Review of Systems   Constitutional:  Negative for chills, fatigue and fever.   HENT:  Negative for congestion, rhinorrhea and sore throat.    Eyes:  Negative for visual disturbance.   Respiratory:  Positive for  "shortness of breath (has emphasemia). Negative for apnea and chest tightness.    Cardiovascular:  Positive for chest pain (rare with walking or with heat). Negative for palpitations and leg swelling.   Gastrointestinal:  Negative for constipation, diarrhea and nausea.   Musculoskeletal:  Positive for back pain and neck pain. Negative for arthralgias.   Allergic/Immunologic: Negative for environmental allergies and food allergies.   Neurological:  Positive for dizziness (mild dizziness getting up and down) and light-headedness. Negative for syncope and weakness.   Hematological:  Bruises/bleeds easily.   Psychiatric/Behavioral:  Negative for sleep disturbance.        Objective     VITALS: /67 (BP Location: Left arm, Patient Position: Sitting, Cuff Size: Adult)   Pulse 81   Ht 170 cm (66.93\")   Wt 52.9 kg (116 lb 9.6 oz)   SpO2 98%   BMI 18.30 kg/m²     LABS:   Lab Results (most recent)       None            IMAGING:   No Images in the past 120 days found..    EXAM:  Physical Exam  Vitals and nursing note reviewed.   Constitutional:       Appearance: She is well-developed.   HENT:      Head: Normocephalic.   Neck:      Thyroid: No thyroid mass.      Vascular: No carotid bruit or JVD.      Trachea: Trachea and phonation normal.   Cardiovascular:      Rate and Rhythm: Normal rate and regular rhythm.      Pulses:           Radial pulses are 2+ on the right side and 2+ on the left side.        Posterior tibial pulses are 2+ on the right side and 2+ on the left side.      Heart sounds: Normal heart sounds. No murmur heard.     No friction rub. No gallop.   Pulmonary:      Effort: Pulmonary effort is normal. No respiratory distress.      Breath sounds: Normal breath sounds. No wheezing or rales.   Musculoskeletal:         General: No swelling. Normal range of motion.      Cervical back: Neck supple.   Skin:     General: Skin is warm and dry.      Capillary Refill: Capillary refill takes less than 2 seconds.      " Findings: No rash.   Neurological:      Mental Status: She is alert and oriented to person, place, and time.   Psychiatric:         Speech: Speech normal.         Behavior: Behavior normal.         Thought Content: Thought content normal.         Judgment: Judgment normal.         Procedure     ECG 12 Lead    Date/Time: 6/18/2024 8:23 AM  Performed by: Duglas Whittaker APRN    Authorized by: Duglas Whittaker APRN  Comparison: compared with previous ECG from 12/13/2023  Similar to previous ECG  Rhythm: sinus rhythm  Rate: normal  BPM: 84  QRS axis: normal  Comments: QTc 463 ms  No acute changes             Assessment & Plan    Diagnosis Plan   1. Precordial pain  ranolazine (Ranexa) 1000 MG 12 hr tablet      2. Shortness of breath  ranolazine (Ranexa) 1000 MG 12 hr tablet      1.  Patient does have chest pain and shortness of breath that has been persistent and unchanged since previous studies.  We did discuss his repeat ischemic workup at this time but she wishes to defer.  2.  Due to patient's persistent chest pain and some relief with Ranexa we will increase her dosage to 1000 mg twice daily.  3.  Informed of signs and symptoms of ACS and advised to seek emergent treatment for any new worsening symptoms.  Patient also advised sooner follow-up as needed.  Also advised to follow-up with family doctor as needed  This note is dictated utilizing voice recognition software.  Although this record has been proof read, transcriptional errors may still be present. If questions occur regarding the content of this record please do not hesitate to call our office.  I have reviewed and confirmed the accuracy of the ROS as documented by the MA/LPN/RN DELORES Rocha    Return if symptoms worsen or fail to improve, for Next scheduled follow up.    Diagnoses and all orders for this visit:    1. Precordial pain (Primary)  -     ranolazine (Ranexa) 1000 MG 12 hr tablet; Take 1 tablet by mouth 2 (Two) Times a Day.  Dispense: 180  tablet; Refill: 3    2. Shortness of breath  -     ranolazine (Ranexa) 1000 MG 12 hr tablet; Take 1 tablet by mouth 2 (Two) Times a Day.  Dispense: 180 tablet; Refill: 3    Other orders  -     rosuvastatin (CRESTOR) 20 MG tablet; Take 1 tablet by mouth Daily.  Dispense: 90 tablet; Refill: 3        Jessica Kaur  reports that she has been smoking cigarettes. She has never used smokeless tobacco. I have educated her on the risk of diseases from using tobacco products such as cancer, COPD, and heart disease. Patient has cut down to three to four cigarettes a day.    I advised her to quit and she is willing to quit.   I spent 5.5 minutes counseling the patient.                      MEDS ORDERED DURING VISIT:  New Medications Ordered This Visit   Medications    ranolazine (Ranexa) 1000 MG 12 hr tablet     Sig: Take 1 tablet by mouth 2 (Two) Times a Day.     Dispense:  180 tablet     Refill:  3    rosuvastatin (CRESTOR) 20 MG tablet     Sig: Take 1 tablet by mouth Daily.     Dispense:  90 tablet     Refill:  3           This document has been electronically signed by Duglas Whittaker Jr., APRN  June 18, 2024 08:21 EDT

## 2025-02-24 RX ORDER — ROSUVASTATIN CALCIUM 20 MG/1
20 TABLET, COATED ORAL DAILY
Qty: 90 TABLET | Refills: 3 | Status: SHIPPED | OUTPATIENT
Start: 2025-02-24

## 2025-05-21 ENCOUNTER — TELEPHONE (OUTPATIENT)
Dept: CARDIOLOGY | Facility: CLINIC | Age: 55
End: 2025-05-21
Payer: COMMERCIAL

## 2025-05-21 NOTE — TELEPHONE ENCOUNTER
[facility]    LVM TO R/S PT'S APPT      MESSAGE COPIED FROM IN-BASKET    Jessica Kaur would like to cancel the following appointments:     Duglas Whittaker in [facility] ([id number]), 5/28/2025  2:00 PM